# Patient Record
Sex: MALE | Race: WHITE | NOT HISPANIC OR LATINO | URBAN - METROPOLITAN AREA
[De-identification: names, ages, dates, MRNs, and addresses within clinical notes are randomized per-mention and may not be internally consistent; named-entity substitution may affect disease eponyms.]

---

## 2017-11-02 ENCOUNTER — APPOINTMENT (OUTPATIENT)
Dept: URBAN - METROPOLITAN AREA CLINIC 217 | Age: 47
Setting detail: DERMATOLOGY
End: 2017-11-02

## 2017-11-02 DIAGNOSIS — B07.8 OTHER VIRAL WARTS: ICD-10-CM

## 2017-11-02 PROCEDURE — 17110 DESTRUCT B9 LESION 1-14: CPT

## 2017-11-02 PROCEDURE — OTHER LIQUID NITROGEN: OTHER

## 2017-11-02 PROCEDURE — OTHER PATIENT SPECIFIC COUNSELING: OTHER

## 2017-11-02 PROCEDURE — OTHER COUNSELING: OTHER

## 2017-11-02 PROCEDURE — OTHER MIPS QUALITY: OTHER

## 2017-11-02 ASSESSMENT — LOCATION ZONE DERM: LOCATION ZONE: FINGER

## 2017-11-02 ASSESSMENT — LOCATION DETAILED DESCRIPTION DERM: LOCATION DETAILED: RIGHT MID DORSAL INDEX FINGER

## 2017-11-02 ASSESSMENT — LOCATION SIMPLE DESCRIPTION DERM: LOCATION SIMPLE: RIGHT INDEX FINGER

## 2017-11-02 NOTE — PROCEDURE: MIPS QUALITY
Quality 110: Preventive Care And Screening: Influenza Immunization: Influenza Immunization Administered during Influenza season
Quality 226: Preventive Care And Screening: Tobacco Use: Screening And Cessation Intervention: Patient screened for tobacco and is a smoker AND received Cessation Counseling
Detail Level: Detailed

## 2024-10-23 NOTE — PROCEDURE: LIQUID NITROGEN
[facility] [place] [address] [facility]  [address]  [facility] [address]  Phone#  [phone]  Fax#  [phone]  [facility]'s Cardiology Office Follow-up Visit             NAME: Toni Mattson  AGE: 75 y.o. SEX: male   : 1948   MRN: [id number]    DATE: 10/23/2024  TIME: 12:08 PM      Assessment & Plan  Persistent atrial fibrillation (HCC)  Stable.  Does not have tachycardia even off of AV nir agents.  Patient reports that he has episodes where he feels like his heart rate is low.  However these occur very infrequently, less than once a month.  I asked him to please check his heart rate during this with a pulse oximeter.  I asked him to call the office if his heart rates are lower than 50s.  If he has symptoms and has a low heart rate, I urged him to go to the emergency room.  If these episodes do become more frequent, I discussed that I would like to order a Zio patch to monitor for symptomatic bradycardia.  However, he has no documented bradycardia at home.  -Continue Xarelto 20 mg daily  H/O prosthetic aortic valve replacement  Status post bioprosthetic aortic valve replacement in .  I reviewed his last transthoracic echocardiogram from  which did show increased valve gradients and velocity.  -As he is more than 10 years out from his bioprosthetic valve replacement, we will plan to do yearly echocardiograms  -Echocardiogram ordered  -He is not taking aspirin due to history of frequent epistaxis (requiring cauterization) while on aspirin and Xarelto  Dyslipidemia  Stable.  -Continue pravastatin 20 mg daily  Essential hypertension  Stable.  Blood pressure 132/70 today.  Continue losartan/HCTZ 100/25 mg daily  Cardioembolic stroke (HCC)  -Xarelto 20 mg daily      Follow up 6 mo for AVR assessment      -----    Chief Complaint   Patient presents with    Follow-up     1 year f/up. Dr. Wright patient       HPI:    Toni Mattson is a 75 y.o.-year-old male who presents to the 
cardiology clinic for follow up for the above-listed problems.     History of aortic stenosis with bicuspid aortic valve, status post bioprosthetic aortic valve replacement in 2007.  He also has a history of persistent atrial fibrillation rate controlled off of AV nir agents, hypertension, prior cardioembolic stroke.    He presents today for routine follow-up.  He has no concerns of chest pain, chest discomfort, or palpitations.  Denies dizziness.  Occasionally feels lightheaded when changing from a sitting to standing position.  Denies lightheadedness otherwise.  States that occasionally he will have a sensation where he feels like his heart rate get swallowing.  Is unable to describe this to me further.  Denies any chest pain or chest discomfort at that time.  Has not checked his heart rate during those times.      -----    I reviewed his transthoracic echocardiogram from 10/27/2022.  He had normal systolic function with an EF of 55%.  Left atrium was moderately dilated.  He had a bioprosthetic aortic valve that is well-seated.  His peak velocity was 3.36 m/s.  Mean gradient was 28 mmHg.  Dimensionless index 0.24.    Lipid panel showed total cholesterol 183, HDL 54, TG 78,      Past history, family history, social history, current medications, vital signs, recent lab and imaging studies and  prior cardiology studies reviewed independently on this visit.    No Known Allergies    Current Outpatient Medications:     finasteride (PROSCAR) 5 mg tablet, , Disp: , Rfl:     losartan-hydrochlorothiazide (HYZAAR) 100-25 MG per tablet, Take by mouth daily, Disp: , Rfl:     Multiple Vitamin Essential TABS, Take 1 tablet by mouth daily, Disp: , Rfl:     pravastatin (PRAVACHOL) 20 mg tablet, Take 1 tablet by mouth daily, Disp: , Rfl:     rivaroxaban (Xarelto) 20 mg tablet, Take 1 tablet (20 mg total) by mouth daily, Disp: 90 tablet, Rfl: 0    VITAMIN B COMPLEX-C CAPS, Take 1 capsule by mouth daily, Disp: , Rfl:     
aspirin 81 MG tablet, Take 1 tablet by mouth daily, Disp: , Rfl:     Review of Systems   Constitutional:  Negative for fatigue.   Respiratory:  Negative for chest tightness and shortness of breath.    Cardiovascular:  Negative for chest pain, palpitations and leg swelling.   Neurological: Negative.    Psychiatric/Behavioral: Negative.         Objective:     Vitals:    10/23/24 1123   BP: 132/70   Pulse: 87   SpO2: 99%     Wt Readings from Last 3 Encounters:   10/23/24 93.4 kg (206 lb)   10/13/23 95.3 kg (210 lb)   11/30/22 98 kg (216 lb)     Pulse Readings from Last 3 Encounters:   10/23/24 87   10/13/23 61   11/30/22 76     BP Readings from Last 3 Encounters:   10/23/24 132/70   10/13/23 118/66   11/30/22 138/76     Physical Exam  Vitals and nursing note reviewed.   Constitutional:       General: He is not in acute distress.     Appearance: Normal appearance. He is well-developed.   HENT:      Head: Normocephalic and atraumatic.   Cardiovascular:      Rate and Rhythm: Normal rate. Rhythm irregularly irregular.      Heart sounds: S1 normal and S2 normal. Murmur (with preserved S2) heard.      Crescendo decrescendo systolic murmur is present with a grade of 3/6.   Pulmonary:      Effort: Pulmonary effort is normal. No respiratory distress.      Breath sounds: Normal breath sounds.   Abdominal:      General: There is no distension.   Musculoskeletal:         General: No swelling.      Cervical back: Neck supple.   Skin:     General: Skin is warm and dry.   Neurological:      Mental Status: He is alert.   Psychiatric:         Mood and Affect: Mood normal.         Pertinent Laboratory/Diagnostic Studies:    Laboratory studies reviewed personally by Rosemary Deluca MD    BMP:   Lab Results   Component Value Date    SODIUM 140 12/12/2023    K 4.0 12/12/2023     12/12/2023    CO2 33 (H) 12/12/2023    BUN 15 12/12/2023    CREATININE 0.84 12/12/2023    CALCIUM 10.1 12/12/2023     CBC:  Lab Results   Component Value Date 
"   WBC 6.18 12/12/2023    HGB 14.1 12/12/2023    HCT 42.7 12/12/2023    MCV 93 12/12/2023     12/12/2023     Lipid Profile:   Lab Results   Component Value Date    HDL 54 12/12/2023     Lab Results   Component Value Date    LDLCALC 113 (H) 12/12/2023     Lab Results   Component Value Date    TRIG 78 12/12/2023      Other labs:  No results found for: \"[id number]\", \"TSH\"  Lab Results   Component Value Date    ALT 23 12/12/2023    AST 27 12/12/2023     Lab Results   Component Value Date    HGBA1C 5.4 12/30/2022         Imaging Studies:     Pertinent imaging studies and cardiac studies were independently reviewed on this visit and findings summarized.    Rosemary Deluca MD, PhD    Portions of the record may have been created with voice recognition software.  Occasional wrong word or \"sound alike\" substitutions may have occurred due to the inherent limitations of voice recognition software.  Read the chart carefully and recognize, using context, where substitutions have occurred. Please reach out to me directly for any clarifications.   "
Consent: The patient's consent was obtained including but not limited to risks of crusting, scabbing, blistering, scarring, darker or lighter pigmentary change, recurrence, incomplete removal and infection.
Detail Level: Simple
Post-Care Instructions: I reviewed with the patient in detail post-care instructions. Patient is to wear sunprotection, and avoid picking at any of the treated lesions. Pt may apply Vaseline to crusted or scabbing areas.  Cryosurgery woundcare sheet given.
Include Z78.9 (Other Specified Conditions Influencing Health Status) As An Associated Diagnosis?: No
Render Post-Care Instructions In Note?: yes
Number Of Freeze-Thaw Cycles: 1 freeze-thaw cycle
Medical Necessity Information: It is in your best interest to select a reason for this procedure from the list below. All of these items fulfill various CMS LCD requirements except the new and changing color options.
Medical Necessity Clause: This procedure was medically necessary because the lesions that were treated were: irritated and inflamed